# Patient Record
Sex: FEMALE | URBAN - METROPOLITAN AREA
[De-identification: names, ages, dates, MRNs, and addresses within clinical notes are randomized per-mention and may not be internally consistent; named-entity substitution may affect disease eponyms.]

---

## 2019-01-18 PROCEDURE — 88305 TISSUE EXAM BY PATHOLOGIST: CPT | Performed by: PATHOLOGY

## 2019-01-21 ENCOUNTER — LAB REQUISITION (OUTPATIENT)
Dept: LAB | Facility: HOSPITAL | Age: 59
End: 2019-01-21
Payer: COMMERCIAL

## 2019-01-21 DIAGNOSIS — R92.0 MAMMOGRAPHIC MICROCALCIFICATION FOUND ON DIAGNOSTIC IMAGING OF BREAST: ICD-10-CM

## 2019-01-23 RX ORDER — ATORVASTATIN CALCIUM 20 MG/1
20 TABLET, FILM COATED ORAL DAILY
COMMUNITY

## 2019-01-23 RX ORDER — LISINOPRIL 5 MG/1
5 TABLET ORAL DAILY
COMMUNITY

## 2019-01-23 RX ORDER — SERTRALINE HYDROCHLORIDE 25 MG/1
50 TABLET, FILM COATED ORAL DAILY
COMMUNITY

## 2019-01-23 RX ORDER — MULTIVIT-MIN/IRON/FOLIC ACID/K 18-600-40
CAPSULE ORAL DAILY
COMMUNITY

## 2019-01-23 NOTE — PRE-PROCEDURE INSTRUCTIONS
Pre-Surgery Instructions:   Medication Instructions    atorvastatin (LIPITOR) 20 mg tablet Instructed patient per Anesthesia Guidelines   Cholecalciferol (VITAMIN D) 2000 units CAPS Instructed patient per Anesthesia Guidelines   Docusate Calcium (STOOL SOFTENER PO) Instructed patient per Anesthesia Guidelines   lisinopril (ZESTRIL) 5 mg tablet Instructed patient per Anesthesia Guidelines   metFORMIN (GLUCOPHAGE) 500 mg tablet Instructed patient per Anesthesia Guidelines   Omeprazole (PRILOSEC PO) Instructed patient per Anesthesia Guidelines   sertraline (ZOLOFT) 25 mg tablet Instructed patient per Anesthesia Guidelines   Vitamin Mixture (JESSA-C PO) Instructed patient per Anesthesia Guidelines  Call cell [phone]  Patient via TC and anesthesia guidelines  instructed to take zoloft*with a sip of water the morning of surgery  Patient given/ instructed on use of chlorhexidine soap per hospital protocol    Patient instructed to stop all ASA, NSAIDS, vitamins and herbal supplements one week prior to surgery or per dr Renata Mccullough

## 2019-01-30 ENCOUNTER — ANESTHESIA EVENT (OUTPATIENT)
Dept: PERIOP | Facility: HOSPITAL | Age: 59
End: 2019-01-30
Payer: COMMERCIAL

## 2019-02-01 ENCOUNTER — HOSPITAL ENCOUNTER (OUTPATIENT)
Facility: HOSPITAL | Age: 59
Setting detail: OUTPATIENT SURGERY
Discharge: HOME | End: 2019-02-01
Attending: PODIATRIST | Admitting: PODIATRIST
Payer: COMMERCIAL

## 2019-02-01 ENCOUNTER — APPOINTMENT (OUTPATIENT)
Dept: RADIOLOGY | Facility: HOSPITAL | Age: 59
End: 2019-02-01
Payer: COMMERCIAL

## 2019-02-01 ENCOUNTER — HOSPITAL ENCOUNTER (OUTPATIENT)
Dept: RADIOLOGY | Facility: HOSPITAL | Age: 59
Setting detail: OUTPATIENT SURGERY
Discharge: HOME | End: 2019-02-01
Payer: COMMERCIAL

## 2019-02-01 ENCOUNTER — ANESTHESIA (OUTPATIENT)
Dept: PERIOP | Facility: HOSPITAL | Age: 59
End: 2019-02-01
Payer: COMMERCIAL

## 2019-02-01 VITALS
WEIGHT: 162 LBS | SYSTOLIC BLOOD PRESSURE: 145 MMHG | OXYGEN SATURATION: 98 % | HEIGHT: 62 IN | BODY MASS INDEX: 29.81 KG/M2 | RESPIRATION RATE: 18 BRPM | HEART RATE: 75 BPM | TEMPERATURE: 98.9 F | DIASTOLIC BLOOD PRESSURE: 76 MMHG

## 2019-02-01 DIAGNOSIS — Z98.890 S/P FOOT SURGERY, RIGHT: Primary | ICD-10-CM

## 2019-02-01 DIAGNOSIS — M79.671 PAIN OF RIGHT FOOT: ICD-10-CM

## 2019-02-01 LAB — GLUCOSE SERPL-MCNC: 141 MG/DL (ref 65–140)

## 2019-02-01 PROCEDURE — C1713 ANCHOR/SCREW BN/BN,TIS/BN: HCPCS | Performed by: PODIATRIST

## 2019-02-01 PROCEDURE — C1769 GUIDE WIRE: HCPCS | Performed by: PODIATRIST

## 2019-02-01 PROCEDURE — 82948 REAGENT STRIP/BLOOD GLUCOSE: CPT

## 2019-02-01 PROCEDURE — 73620 X-RAY EXAM OF FOOT: CPT

## 2019-02-01 PROCEDURE — 73630 X-RAY EXAM OF FOOT: CPT

## 2019-02-01 DEVICE — SCREW COMP 2.5 X 16MM MICRO FT: Type: IMPLANTABLE DEVICE | Site: FOOT | Status: FUNCTIONAL

## 2019-02-01 DEVICE — IMPLANTABLE DEVICE: Type: IMPLANTABLE DEVICE | Site: FOOT | Status: FUNCTIONAL

## 2019-02-01 RX ORDER — FENTANYL CITRATE/PF 50 MCG/ML
25 SYRINGE (ML) INJECTION
Status: DISCONTINUED | OUTPATIENT
Start: 2019-02-01 | End: 2019-02-01 | Stop reason: HOSPADM

## 2019-02-01 RX ORDER — DEXAMETHASONE SODIUM PHOSPHATE 4 MG/ML
INJECTION, SOLUTION INTRA-ARTICULAR; INTRALESIONAL; INTRAMUSCULAR; INTRAVENOUS; SOFT TISSUE AS NEEDED
Status: DISCONTINUED | OUTPATIENT
Start: 2019-02-01 | End: 2019-02-01 | Stop reason: SURG

## 2019-02-01 RX ORDER — ONDANSETRON 2 MG/ML
INJECTION INTRAMUSCULAR; INTRAVENOUS AS NEEDED
Status: DISCONTINUED | OUTPATIENT
Start: 2019-02-01 | End: 2019-02-01 | Stop reason: SURG

## 2019-02-01 RX ORDER — SODIUM CHLORIDE 9 MG/ML
125 INJECTION, SOLUTION INTRAVENOUS CONTINUOUS
Status: DISCONTINUED | OUTPATIENT
Start: 2019-02-01 | End: 2019-02-01 | Stop reason: HOSPADM

## 2019-02-01 RX ORDER — FENTANYL CITRATE 50 UG/ML
INJECTION, SOLUTION INTRAMUSCULAR; INTRAVENOUS AS NEEDED
Status: DISCONTINUED | OUTPATIENT
Start: 2019-02-01 | End: 2019-02-01 | Stop reason: SURG

## 2019-02-01 RX ORDER — ONDANSETRON 2 MG/ML
4 INJECTION INTRAMUSCULAR; INTRAVENOUS ONCE AS NEEDED
Status: DISCONTINUED | OUTPATIENT
Start: 2019-02-01 | End: 2019-02-01 | Stop reason: HOSPADM

## 2019-02-01 RX ORDER — HYDROCODONE BITARTRATE AND ACETAMINOPHEN 5; 325 MG/1; MG/1
1 TABLET ORAL EVERY 6 HOURS PRN
Qty: 24 TABLET | Refills: 0 | Status: SHIPPED | OUTPATIENT
Start: 2019-02-01 | End: 2019-02-11

## 2019-02-01 RX ORDER — ACETAMINOPHEN 500 MG
1000 TABLET ORAL EVERY 6 HOURS PRN
COMMUNITY

## 2019-02-01 RX ORDER — MIDAZOLAM HYDROCHLORIDE 1 MG/ML
INJECTION INTRAMUSCULAR; INTRAVENOUS AS NEEDED
Status: DISCONTINUED | OUTPATIENT
Start: 2019-02-01 | End: 2019-02-01 | Stop reason: SURG

## 2019-02-01 RX ORDER — PROPOFOL 10 MG/ML
INJECTION, EMULSION INTRAVENOUS AS NEEDED
Status: DISCONTINUED | OUTPATIENT
Start: 2019-02-01 | End: 2019-02-01 | Stop reason: SURG

## 2019-02-01 RX ORDER — HYDROCODONE BITARTRATE AND ACETAMINOPHEN 5; 325 MG/1; MG/1
1 TABLET ORAL EVERY 6 HOURS PRN
Status: DISCONTINUED | OUTPATIENT
Start: 2019-02-01 | End: 2019-02-01 | Stop reason: HOSPADM

## 2019-02-01 RX ADMIN — FENTANYL CITRATE 50 MCG: 50 INJECTION, SOLUTION INTRAMUSCULAR; INTRAVENOUS at 13:57

## 2019-02-01 RX ADMIN — PROPOFOL 200 MG: 10 INJECTION, EMULSION INTRAVENOUS at 13:57

## 2019-02-01 RX ADMIN — MIDAZOLAM 2 MG: 1 INJECTION INTRAMUSCULAR; INTRAVENOUS at 13:50

## 2019-02-01 RX ADMIN — ONDANSETRON 4 MG: 2 INJECTION INTRAMUSCULAR; INTRAVENOUS at 14:20

## 2019-02-01 RX ADMIN — FENTANYL CITRATE 25 MCG: 50 INJECTION, SOLUTION INTRAMUSCULAR; INTRAVENOUS at 14:05

## 2019-02-01 RX ADMIN — FENTANYL CITRATE 25 MCG: 50 INJECTION, SOLUTION INTRAMUSCULAR; INTRAVENOUS at 15:40

## 2019-02-01 RX ADMIN — SODIUM CHLORIDE 125 ML/HR: 0.9 INJECTION, SOLUTION INTRAVENOUS at 11:48

## 2019-02-01 RX ADMIN — LIDOCAINE HYDROCHLORIDE 60 MG: 20 INJECTION, SOLUTION INTRAVENOUS at 13:57

## 2019-02-01 RX ADMIN — DEXAMETHASONE SODIUM PHOSPHATE 4 MG: 4 INJECTION, SOLUTION INTRAMUSCULAR; INTRAVENOUS at 14:08

## 2019-02-01 NOTE — OP NOTE
OPERATIVE REPORT  PATIENT NAME: Feng Yanez    :  1960  MRN: [id number]  Pt Location: [facility] OR ROOM 07    SURGERY DATE: 2019    Surgeon(s) and Role:     * Jessica Squires DPM - Primary     * Heidy Ling DPM - Assisting    Preop Diagnosis:  Pain of right foot [M79 671]  Bunion deformity right foot  Instability right first tarsometatarsal joint    Post-Op Diagnosis Codes:     * Pain of right foot [M79 671]  Bunion deformity right foot  Instability right first tarsometatarsal joint      Procedure(s) (LRB):  BUNIONECTOMY, GREAT TOE OSTEOTOMY (Right)  1st TMTJ FUSION (Right)    Hemostasis: PNAT about right ankle for 108 minutes at 250mmHg     Estimated Blood Loss:   Minimal    Materials: Alloysnc DBM 3mL, 4 0 x 42mm screw (x1), 16mm screw (x1)      Injectables: 20cc 1:1 mix of 1% lidocaine plain and 0 5% marcaine plain preoperatively     Anesthesia Type:   Choice + local     Operative Indications:  Pain of right foot [M79 671]    Operative Findings:  Consistent with diagnosis: right hallux abducto valgus deformity     Complications:   Please note the bone marrow aspirate was unable to be used intraoperatively due to centrifuge machine malfunction  In lieu, bone graft was made using bone putty and cancellous bone as taken from resection of medial bump  Procedure and Technique:  Under mild sedation the patient was brought into the OR and placed on the table in the supine position  A pneumatic tourniquet was placed about the right ankle   Following IV sedation a time out was performed  Local anesthetic was then infiltrated as described above  The extremity was then scrubbed, prepped, and draped in the usual aseptic manner   An eschmark bandage was then used to exsanguinate the right foot and the tourniquet was inflated to 250 millimeters of pressure      Attention was directed to the right foot where a 7-8 cm linear incision was made just proximal to the first tarsometatarsal joint (TMJ) and extended dorsally to the hallux interphalangeal joint  Dissection was continued through the subq layer, bleeders were cauterized as needed, and via blunt means dissection was made to the first interspace where a lateral release was made, the toe was then adducted following lateral release  The first MTPJ was incised and via sharp means and subperiosteal dissection the metatarsal head was then exposed and a moist sponge was now placed over the incision and attention was directed to the first TMJ      Using a 15 blade sharp incision was made to bone, sub-periosteal dissection was carried down exposing the first TMJ  With retraction of the small communicating nerve was protected  After obtaining adequate exposure a small joint distractor was placed and with osteotomes and bone curette the articular cartilage was removed  This was followed by planing of subchondral bone via sagittal saw  A sagittal saw was used to bevel the dorsolateral aspect of the medial cuneiform in order to help reduce the intermetatarsal angle  The contiguous aspects of the joint were then drilled with a 1 6mm drill bit to promote fusion  The bone graft was then placed within the fusion site  Pins from the distractor were removed  A large tenaculum was placed around the 1st and 2nd metatarsals and was then closed thus dialing in the IM correction under fluoroscopy  Then a guidepin from [employer] headless screw tray was used for provisional fixation, and was placed under imaging  While placing the K wire care was taken to plantarflex the first metatarsal and derotate it from valgus  Then appropriate size 4 0 cannulated compression screw was placed across the first tarsometatarsal joint according to manufacturing protocol  Positioning was confirmed with C-arm fluoroscopy  The site was flushed with sterile saline      At this time redundant bone from medial aspect of the metatarsal head was removed with sagittal saw       The decision was made to perform hallux osteotomy given the hallux was in slight abduction  Using sagittal saw an Naif bunionectomy was performed in an oblique manner thus allowing for single screw reduction under image, this was obtained first by provisional fixation with appropriate guidepin, and then when satisfied the pin was used to place a 2 5mm headless [employer] screw across the osteotomy  Reduction confirmed with fluoroscopy  Following irrigation the incision was closed with 2-0 Vicryl for deep closure, the subcutaneous tissues were closed using 3-0 Vicryl in a running fashion  Subcuticular layers were closed using 4-0 Monocryl in a running fashion as well  The surgical incisions including bone graft site were coated with benzoin and Steri-Strips were applied      Surgical site was dressed with Xeroform gauze and Kerlix, this was then covered by posterior splint  The tourniquet was deflated  A prompt hyperemic response was noted to the right foot       The patient tolerated the procedure and anesthesia well and was transferred to the PACU with vital signs stable          Patient Disposition:  PACU     SIGNATURE: Geovani Ovalle DPM  DATE: February 1, 2019  TIME: 4:24 PM

## 2019-02-01 NOTE — ANESTHESIA POSTPROCEDURE EVALUATION
Post-Op Assessment Note      CV Status:  Stable    Mental Status:  Alert and awake    Hydration Status:  Euvolemic    PONV Controlled:  Controlled    Airway Patency:  Patent    Post Op Vitals Reviewed: Yes          Staff: Anesthesiologist           /68 (02/01/19 1700)    Temp 98 9 °F (37 2 °C) (02/01/19 1700)    Pulse 76 (02/01/19 1700)   Resp 17 (02/01/19 1700)    SpO2 95 % (02/01/19 1700)

## 2019-02-01 NOTE — ANESTHESIA PREPROCEDURE EVALUATION
Review of Systems/Medical History  Patient summary reviewed    No history of anesthetic complications     Cardiovascular  Exercise tolerance (METS): >4,  Hyperlipidemia, Hypertension controlled,   Comment: Hx coarctation of aorta, aorta graft placed in 1976 at age 17  Denies any shortness of breath/chest pain  Pt reports had an ECHO 2 years ago, graft functioning well  ,  Pulmonary  Smoker ex-smoker  ,        GI/Hepatic    GERD well controlled,             Endo/Other  Diabetes well controlled type 2 Oral agent,      GYN  Negative gynecology ROS          Hematology  Negative hematology ROS      Musculoskeletal    Arthritis     Neurology  Negative neurology ROS      Psychology   Anxiety, Depression ,              Physical Exam    Airway    Mallampati score: II  TM Distance: >3 FB  Neck ROM: full     Dental   No notable dental hx     Cardiovascular  Rhythm: regular, Rate: normal,     Pulmonary  Breath sounds clear to auscultation,     Other Findings        Anesthesia Plan  ASA Score- 3     Anesthesia Type- general and regional with ASA Monitors  Additional Monitors:   Airway Plan:         Plan Factors-  Patient did not smoke on day of surgery  Induction- intravenous  Postoperative Plan-     Informed Consent- Anesthetic plan and risks discussed with patient

## 2019-02-01 NOTE — DISCHARGE INSTRUCTIONS
[facility]  [facility]  Dr Elza Hernandez  1)  Cold Pack: Apply a cold pack for 45 - Minutes intervals just above the surgical site for the initial 24-48 hours  Never place on the toes  If a cast has been applied  Apply the cold pack to the thigh or knee area  2)  Elevation and Rest: Keep the foot elevated at least as high as the hips for the first 24-48 hours  It would be beneficial for the foot to be elevated when you are sitting during the first 7-10 days  Elevating the foot above the heart level will help control post operative pain and swelling  3)  Medication: Take prescription as prescribed by your physician  If you have any difficulty or side effects with the medication, stop taking immediately and notify your physician at once  Medications given today:     Hydrocodone     3a  if applicable you may be given one of the following for prevention of blood clots  O Xarelto    Protection of Surgical Site/ Assistive Devices:  a) You will be given one of the following:  O walker and crutches (use whichever you prefer to help maintain non-weight-bearing to the right foot)  4) DO NOT GET THE BADAGE WET UNTIL THE DOCTOR GIVES PERMISSION  Keep the bandage clean, dry and intact until your follow-up appointment  5) If you had a peripheral nerve block performed by anesthesia department the numbness and loss of motor function of the extremity can last anywhere from 12-24 hours and sometimes a little bit longer    General Information  1)  Bleeding: some bleeding through the bandage is normal  If bleeding persists, you may attempt to reinforce the existing bandage while following the above instruction  If bleeding persists, notify the physician  2)  Temperature: if your temperature rises [id number] 5 degree, call the office [phone]  3)  Problem: If you notice increasing swelling and/ or pain 2 to 3 days following surgery , please notify    If a splint has been applied and you experience pain to extent the medication prescribed for pain does not seem to be effective you should remove the ace wraps and remove the splint if applied and allow about one half hour for relief then reapply splint and ace wraps  Should pain persist then please notify your physician  4)  Redressing: Keep follow-up appointment as scheduled with Dr Jackie Dahl  Keep dressing clean, dry, and intact until your follow-up appointment  Pain: Within the first 24 hours following surgery, if your pain is not controlled sufficiently with pain medication, please check that your bandage is not too tight  You may loosen the badge without removing it  Wait 30 minutes, if your pain is not relieved  Please call the office (     Hydrocodone/Acetaminophen (By mouth)   Acetaminophen (e-ykzb-j-MIN-oh-fen), Hydrocodone Bitartrate (wqk-brco-DZH-done bye-TAR-trate)  Treats pain  This medicine contains a narcotic pain reliever  Brand Name(s): Hycet, Lorcet, Lorcet HD, Lorcet Plus, Lortab 10/325, Lortab 5/325, Lortab 7 5/325, Lortab Elixir, Norco, Verdrocet, Vicodin, Vicodin ES, Vicodin HP, Xodol, Xodol 5/300   There may be other brand names for this medicine  When This Medicine Should Not Be Used: This medicine is not right for everyone  Do not use it if you had an allergic reaction to acetaminophen, hydrocodone, or other narcotic medicines, or stomach or bowel blockage (including paralytic ileus)  How to Use This Medicine:   Capsule, Liquid, Tablet  · Your doctor will tell you how much medicine to use  Do not use more than directed  · An overdose can be dangerous  Follow directions carefully so you do not get too much medicine at one time  · Oral liquid: Measure the oral liquid medicine with a marked measuring spoon, oral syringe, or medicine cup  · Drink plenty of liquids to help avoid constipation  · This medicine should come with a Medication Guide   Ask your pharmacist for a copy if you do not have one  · Missed dose: Take a dose as soon as you remember  If it is almost time for your next dose, wait until then and take a regular dose  Do not take extra medicine to make up for a missed dose  · Store the medicine in a closed container at room temperature, away from heat, moisture, and direct light  Flush any unused Norco® tablets down the toilet  Drugs and Foods to Avoid:   Ask your doctor or pharmacist before using any other medicine, including over-the-counter medicines, vitamins, and herbal products  · Do not use this medicine if you are using or have used an MAO inhibitor within the past 14 days  · Some medicines can affect how hydrocodone/acetaminophen works  Tell your doctor if you are using any of the following:   ¨ Carbamazepine, erythromycin, ketoconazole, mirtazapine, phenytoin, rifampin, ritonavir, tramadol, trazodone  ¨ Diuretic (water pill)  ¨ Medicine to treat depression or mental health problems  ¨ Medicine to treat migraine headaches  ¨ Phenothiazine medicine  · Tell your doctor if you use anything else that makes you sleepy  Some examples are allergy medicine, narcotic pain medicine, and alcohol  Tell your doctor if you are using buprenorphine, butorphanol, nalbuphine, pentazocine, or a muscle relaxer  · Do not drink alcohol while you are using this medicine  Acetaminophen can damage your liver, and your risk is higher if you also drink alcohol  Warnings While Using This Medicine:   · Tell your doctor if you are pregnant or breastfeeding, or if you have kidney disease, liver disease, lung or breathing problems, gallbladder or pancreas problems, an underactive thyroid, Giovany disease, prostate problems, trouble urinating, stomach problems, or a history of head injury or brain tumor, seizures, alcohol or drug addiction    · This medicine may cause the following problems:   ¨ High risk of overdose, which can lead to death  ¨ Respiratory depression (serious breathing problem that can be life-threatening)  ¨ Liver problems  ¨ Serious skin reactions  ¨ Serotonin syndrome (when used with certain medicines)  · This medicine can be habit-forming  Do not use more than your prescribed dose  Call your doctor if you think your medicine is not working  · This medicine may make you dizzy or drowsy  Do not drive or doing anything else that could be dangerous until you know how this medicine affects you  · This medicine contains acetaminophen  Read the labels of all other medicines you are using to see if they also contain acetaminophen, or ask your doctor or pharmacist  Tatyana Ceja not use more than 4 grams (4,000 milligrams) total of acetaminophen in one day  · Tell any doctor or dentist who treats you that you are using this medicine  This medicine may affect certain medical test results  · This medicine may cause constipation, especially with long-term use  Ask your doctor if you should use a laxative to prevent and treat constipation  · This medicine could cause infertility  Talk with your doctor before using this medicine if you plan to have children  · Keep all medicine out of the reach of children  Never share your medicine with anyone    Possible Side Effects While Using This Medicine:   Call your doctor right away if you notice any of these side effects:  · Allergic reaction: Itching or hives, swelling in your face or hands, swelling or tingling in your mouth or throat, chest tightness, trouble breathing  · Anxiety, restlessness, fast heartbeat, fever, sweating, muscle spasms, twitching, diarrhea, seeing or hearing things that are not there  · Blistering, peeling, red skin rash  · Blue lips, fingernails, or skin  · Dark urine or pale stools, loss of appetite, nausea or vomiting, stomach pain, yellow skin or eyes  · Extreme weakness, shallow breathing, slow heartbeat, sweating, seizures, cold or clammy skin  · Lightheadedness, dizziness, fainting  If you notice these less serious side effects, talk with your doctor:   · Constipation, nausea, vomiting  · Tiredness or sleepiness  If you notice other side effects that you think are caused by this medicine, tell your doctor  Call your doctor for medical advice about side effects  You may report side effects to FDA at 9-422-FDA-5371  © 2017 [employer] Information is for End User's use only and may not be sold, redistributed or otherwise used for commercial purposes  The above information is an  only  It is not intended as medical advice for individual conditions or treatments  Talk to your doctor, nurse or pharmacist before following any medical regimen to see if it is safe and effective for you

## 2019-02-01 NOTE — DISCHARGE SUMMARY
Discharge Summary Outpatient Procedure Podiatry -   Ruy Colvin 66 y o  female MRN: [id number]  Unit/Bed#: OR POOL Encounter: [id number]    Admission Date: 2/1/2019     Admitting Diagnosis: Pain of right foot [M79 671]    Discharge Diagnosis: same    Procedures Performed: BUNIONECTOMY, GREAT TOE OSTEOTOMY: 21904 (CPT®)  1st TMTJ FUSION:      Complications: none    Condition at Discharge: stable    Discharge instructions/Information to patient and family:   See after visit summary for information provided to patient and family  Provisions for Follow-Up Care/Important appointments:  See after visit summary for information related to follow-up care and any pertinent home health orders  Discharge Medications:  See after visit summary for reconciled discharge medications provided to patient and family

## 2019-04-19 ENCOUNTER — ANESTHESIA EVENT (OUTPATIENT)
Dept: PERIOP | Facility: HOSPITAL | Age: 59
End: 2019-04-19
Payer: COMMERCIAL

## 2019-04-22 ENCOUNTER — HOSPITAL ENCOUNTER (OUTPATIENT)
Facility: HOSPITAL | Age: 59
Setting detail: OUTPATIENT SURGERY
Discharge: HOME | End: 2019-04-22
Attending: PODIATRIST | Admitting: PODIATRIST
Payer: COMMERCIAL

## 2019-04-22 ENCOUNTER — HOSPITAL ENCOUNTER (OUTPATIENT)
Dept: RADIOLOGY | Facility: HOSPITAL | Age: 59
Setting detail: OUTPATIENT SURGERY
Discharge: HOME | End: 2019-04-22
Payer: COMMERCIAL

## 2019-04-22 ENCOUNTER — APPOINTMENT (OUTPATIENT)
Dept: RADIOLOGY | Facility: HOSPITAL | Age: 59
End: 2019-04-22
Payer: COMMERCIAL

## 2019-04-22 ENCOUNTER — ANESTHESIA (OUTPATIENT)
Dept: PERIOP | Facility: HOSPITAL | Age: 59
End: 2019-04-22
Payer: COMMERCIAL

## 2019-04-22 VITALS
BODY MASS INDEX: 29.81 KG/M2 | RESPIRATION RATE: 18 BRPM | SYSTOLIC BLOOD PRESSURE: 144 MMHG | HEIGHT: 62 IN | WEIGHT: 162 LBS | HEART RATE: 74 BPM | DIASTOLIC BLOOD PRESSURE: 81 MMHG | TEMPERATURE: 98.8 F | OXYGEN SATURATION: 98 %

## 2019-04-22 DIAGNOSIS — Z98.890 POST-OPERATIVE STATE: Primary | ICD-10-CM

## 2019-04-22 DIAGNOSIS — M20.12 ACQUIRED HALLUX VALGUS OF LEFT FOOT: ICD-10-CM

## 2019-04-22 LAB — GLUCOSE SERPL-MCNC: 125 MG/DL (ref 65–140)

## 2019-04-22 PROCEDURE — 73620 X-RAY EXAM OF FOOT: CPT

## 2019-04-22 PROCEDURE — 73630 X-RAY EXAM OF FOOT: CPT

## 2019-04-22 PROCEDURE — C1769 GUIDE WIRE: HCPCS | Performed by: PODIATRIST

## 2019-04-22 PROCEDURE — 82948 REAGENT STRIP/BLOOD GLUCOSE: CPT

## 2019-04-22 PROCEDURE — C1713 ANCHOR/SCREW BN/BN,TIS/BN: HCPCS | Performed by: PODIATRIST

## 2019-04-22 DEVICE — IMPLANTABLE DEVICE: Type: IMPLANTABLE DEVICE | Site: FOOT | Status: FUNCTIONAL

## 2019-04-22 RX ORDER — PROPOFOL 10 MG/ML
INJECTION, EMULSION INTRAVENOUS AS NEEDED
Status: DISCONTINUED | OUTPATIENT
Start: 2019-04-22 | End: 2019-04-22 | Stop reason: SURG

## 2019-04-22 RX ORDER — OXYCODONE HYDROCHLORIDE AND ACETAMINOPHEN 5; 325 MG/1; MG/1
1 TABLET ORAL EVERY 4 HOURS PRN
Qty: 30 TABLET | Refills: 0 | Status: SHIPPED | OUTPATIENT
Start: 2019-04-22 | End: 2019-05-02

## 2019-04-22 RX ORDER — GABAPENTIN 100 MG/1
100 CAPSULE ORAL 2 TIMES DAILY
COMMUNITY
Start: 2016-05-27

## 2019-04-22 RX ORDER — FENTANYL CITRATE 50 UG/ML
25 INJECTION, SOLUTION INTRAMUSCULAR; INTRAVENOUS
Status: DISCONTINUED | OUTPATIENT
Start: 2019-04-22 | End: 2019-04-22 | Stop reason: HOSPADM

## 2019-04-22 RX ORDER — ONDANSETRON 2 MG/ML
INJECTION INTRAMUSCULAR; INTRAVENOUS AS NEEDED
Status: DISCONTINUED | OUTPATIENT
Start: 2019-04-22 | End: 2019-04-22 | Stop reason: SURG

## 2019-04-22 RX ORDER — MIDAZOLAM HYDROCHLORIDE 1 MG/ML
INJECTION INTRAMUSCULAR; INTRAVENOUS AS NEEDED
Status: DISCONTINUED | OUTPATIENT
Start: 2019-04-22 | End: 2019-04-22 | Stop reason: SURG

## 2019-04-22 RX ORDER — FENTANYL CITRATE 50 UG/ML
INJECTION, SOLUTION INTRAMUSCULAR; INTRAVENOUS AS NEEDED
Status: DISCONTINUED | OUTPATIENT
Start: 2019-04-22 | End: 2019-04-22 | Stop reason: SURG

## 2019-04-22 RX ORDER — SODIUM CHLORIDE 9 MG/ML
125 INJECTION, SOLUTION INTRAVENOUS CONTINUOUS
Status: DISCONTINUED | OUTPATIENT
Start: 2019-04-22 | End: 2019-04-22 | Stop reason: HOSPADM

## 2019-04-22 RX ORDER — OXYCODONE HYDROCHLORIDE AND ACETAMINOPHEN 5; 325 MG/1; MG/1
1 TABLET ORAL EVERY 4 HOURS PRN
Status: DISCONTINUED | OUTPATIENT
Start: 2019-04-22 | End: 2019-04-22 | Stop reason: HOSPADM

## 2019-04-22 RX ORDER — ONDANSETRON 2 MG/ML
4 INJECTION INTRAMUSCULAR; INTRAVENOUS EVERY 6 HOURS PRN
Status: DISCONTINUED | OUTPATIENT
Start: 2019-04-22 | End: 2019-04-22 | Stop reason: HOSPADM

## 2019-04-22 RX ORDER — CEFAZOLIN SODIUM 1 G/50ML
SOLUTION INTRAVENOUS AS NEEDED
Status: DISCONTINUED | OUTPATIENT
Start: 2019-04-22 | End: 2019-04-22 | Stop reason: SURG

## 2019-04-22 RX ORDER — BUPIVACAINE HYDROCHLORIDE 5 MG/ML
INJECTION, SOLUTION PERINEURAL AS NEEDED
Status: DISCONTINUED | OUTPATIENT
Start: 2019-04-22 | End: 2019-04-22 | Stop reason: HOSPADM

## 2019-04-22 RX ORDER — MAGNESIUM HYDROXIDE 1200 MG/15ML
LIQUID ORAL AS NEEDED
Status: DISCONTINUED | OUTPATIENT
Start: 2019-04-22 | End: 2019-04-22 | Stop reason: HOSPADM

## 2019-04-22 RX ORDER — CEPHALEXIN 500 MG/1
500 CAPSULE ORAL EVERY 12 HOURS SCHEDULED
Qty: 14 CAPSULE | Refills: 0 | Status: SHIPPED | OUTPATIENT
Start: 2019-04-22 | End: 2019-04-29

## 2019-04-22 RX ORDER — RIVAROXABAN 10 MG/1
10 TABLET, FILM COATED ORAL DAILY
Refills: 0 | COMMUNITY
Start: 2019-04-15

## 2019-04-22 RX ORDER — COLLAGENASE SANTYL 250 [ARB'U]/G
1 OINTMENT TOPICAL 2 TIMES DAILY
Refills: 1 | COMMUNITY
Start: 2019-04-18

## 2019-04-22 RX ADMIN — PROPOFOL 100 MG: 10 INJECTION, EMULSION INTRAVENOUS at 13:50

## 2019-04-22 RX ADMIN — SODIUM CHLORIDE 125 ML/HR: 0.9 INJECTION, SOLUTION INTRAVENOUS at 10:34

## 2019-04-22 RX ADMIN — FENTANYL CITRATE 25 MCG: 50 INJECTION, SOLUTION INTRAMUSCULAR; INTRAVENOUS at 14:11

## 2019-04-22 RX ADMIN — ONDANSETRON 4 MG: 2 INJECTION INTRAMUSCULAR; INTRAVENOUS at 15:00

## 2019-04-22 RX ADMIN — MIDAZOLAM 2 MG: 1 INJECTION INTRAMUSCULAR; INTRAVENOUS at 13:46

## 2019-04-22 RX ADMIN — SODIUM CHLORIDE 125 ML/HR: 0.9 INJECTION, SOLUTION INTRAVENOUS at 15:50

## 2019-04-22 RX ADMIN — CEFAZOLIN SODIUM 1000 MG: 1 SOLUTION INTRAVENOUS at 13:46

## 2019-04-22 RX ADMIN — SODIUM CHLORIDE: 0.9 INJECTION, SOLUTION INTRAVENOUS at 14:14

## (undated) DEVICE — 3M™ STERI-STRIP™ REINFORCED ADHESIVE SKIN CLOSURES, R1542, 1/4 IN X 1-1/2 IN (6 MM X 38 MM), 6 STRIPS/ENVELOPE: Brand: 3M™ STERI-STRIP™

## (undated) DEVICE — SUT ETHILON 3-0 PS-1 18 IN 1663G

## (undated) DEVICE — NEEDLE 25G X 1 1/2

## (undated) DEVICE — COBAN 4 IN STERILE

## (undated) DEVICE — ABDOMINAL PAD: Brand: DERMACEA

## (undated) DEVICE — BLADE SAGITTAL 25.6 X 9.5MM

## (undated) DEVICE — SUT VICRYL 3-0 SH 27 IN J416H

## (undated) DEVICE — TUBING SUCTION 5MM X 12 FT

## (undated) DEVICE — STERI STRIP 1/2 INCH

## (undated) DEVICE — DRAPE C-ARM X-RAY

## (undated) DEVICE — 3M™ STERI-STRIP™ REINFORCED ADHESIVE SKIN CLOSURES, R1546, 1/4 IN X 4 IN (6 MM X 100 MM), 10 STRIPS/ENVELOPE: Brand: 3M™ STERI-STRIP™

## (undated) DEVICE — CHLORAPREP HI-LITE 26ML ORANGE

## (undated) DEVICE — GLOVE PI ULTRA TOUCH SZ.7.0

## (undated) DEVICE — 10FR FRAZIER SUCTION HANDLE: Brand: CARDINAL HEALTH

## (undated) DEVICE — OCCLUSIVE GAUZE STRIP,3% BISMUTH TRIBROMOPHENATE IN PETROLATUM BLEND: Brand: XEROFORM

## (undated) DEVICE — ACE WRAP 6 IN UNSTERILE

## (undated) DEVICE — GLOVE INDICATOR PI UNDERGLOVE SZ 8 BLUE

## (undated) DEVICE — SUT VICRYL 2-0 CT-2 27 IN J269H

## (undated) DEVICE — CAST PADDING 4 IN SYNTHETIC NON-STRL

## (undated) DEVICE — SPECIMEN CONTAINER STERILE PEEL PACK

## (undated) DEVICE — WEBRIL 6 IN UNSTERILE

## (undated) DEVICE — Device

## (undated) DEVICE — TWIST DRILL 2.0MM DIA 127.0MM LONG

## (undated) DEVICE — PREP PAD BNS: Brand: CONVERTORS

## (undated) DEVICE — SPONGE LAP 18 X 18 IN STRL RFD

## (undated) DEVICE — BETHLEHEM UNIVERSAL  MIONR EXT: Brand: CARDINAL HEALTH

## (undated) DEVICE — MASTISOL LIQ ADHESIVE 2/3ML

## (undated) DEVICE — NEEDLE 18 G X 1 1/2

## (undated) DEVICE — GAUZE SPONGES,16 PLY: Brand: CURITY

## (undated) DEVICE — STRETCH BANDAGE: Brand: CURITY

## (undated) DEVICE — DRAPE C-ARMOUR

## (undated) DEVICE — SCD SEQUENTIAL COMPRESSION COMFORT SLEEVE MEDIUM KNEE LENGTH: Brand: KENDALL SCD

## (undated) DEVICE — CUFF TOURNIQUET 18 X 4 IN QUICK CONNECT DISP 1 BLADDER

## (undated) DEVICE — GLOVE SRG BIOGEL ECLIPSE 6

## (undated) DEVICE — ACE WRAP 4 IN UNSTERILE

## (undated) DEVICE — 2000CC GUARDIAN II: Brand: GUARDIAN

## (undated) DEVICE — GUIDEWIRE 0.045 IN TROCAR TIP

## (undated) DEVICE — MEDI-VAC TUBING CONNECTOR 6-IN-1 STRAIGHT: Brand: CARDINAL HEALTH

## (undated) DEVICE — GLOVE INDICATOR PI UNDERGLOVE SZ 7.5 BLUE

## (undated) DEVICE — IMPLANTABLE DEVICE
Type: IMPLANTABLE DEVICE | Site: FOOT | Status: NON-FUNCTIONAL
Removed: 2019-02-01

## (undated) DEVICE — DRAPE EQUIPMENT RF WAND

## (undated) DEVICE — GLOVE INDICATOR PI UNDERGLOVE SZ 6.5 BLUE

## (undated) DEVICE — PAD CAST 6 IN COTTON NON STERILE

## (undated) DEVICE — INTENDED FOR TISSUE SEPARATION, AND OTHER PROCEDURES THAT REQUIRE A SHARP SURGICAL BLADE TO PUNCTURE OR CUT.: Brand: BARD-PARKER ® CARBON RIB-BACK BLADES

## (undated) DEVICE — TWIST DRILL 1.6MM DIA 127.0MM LONG

## (undated) DEVICE — PENCIL ELECTROSURG E-Z CLEAN -0035H

## (undated) DEVICE — TAPE CAST 4IN FIBERGLASS 4YD WHITE

## (undated) DEVICE — ASTOUND STANDARD SURGICAL GOWN, XL: Brand: CONVERTORS

## (undated) DEVICE — WIRE 0.86MM TROCAR TIP

## (undated) DEVICE — KERLIX BANDAGE ROLL: Brand: KERLIX

## (undated) DEVICE — PAD CAST 4 IN COTTON NON STERILE

## (undated) DEVICE — SYRINGE 10ML LL

## (undated) DEVICE — KIT ANGEL BONE MARROW PROCESSING

## (undated) DEVICE — SPLINT ORTHO-GLASS 4IN X 15FT

## (undated) DEVICE — GLOVE SRG BIOGEL ORTHOPEDIC 7.5

## (undated) DEVICE — GLOVE SRG BIOGEL ORTHOPEDIC 8